# Patient Record
Sex: MALE | Race: WHITE | Employment: UNEMPLOYED | URBAN - METROPOLITAN AREA
[De-identification: names, ages, dates, MRNs, and addresses within clinical notes are randomized per-mention and may not be internally consistent; named-entity substitution may affect disease eponyms.]

---

## 2020-11-30 ENCOUNTER — HOSPITAL ENCOUNTER (EMERGENCY)
Age: 25
Discharge: HOME | End: 2020-11-30

## 2020-11-30 VITALS
OXYGEN SATURATION: 97 % | TEMPERATURE: 99 F | RESPIRATION RATE: 16 BRPM | HEART RATE: 80 BPM | HEIGHT: 64 IN | DIASTOLIC BLOOD PRESSURE: 71 MMHG | SYSTOLIC BLOOD PRESSURE: 108 MMHG | BODY MASS INDEX: 20.49 KG/M2 | WEIGHT: 120 LBS

## 2020-11-30 PROCEDURE — 6370000000 HC RX 637 (ALT 250 FOR IP): Performed by: NURSE PRACTITIONER

## 2020-11-30 PROCEDURE — 99284 EMERGENCY DEPT VISIT MOD MDM: CPT

## 2020-11-30 RX ORDER — LIDOCAINE HYDROCHLORIDE 20 MG/ML
15 SOLUTION OROPHARYNGEAL ONCE
Status: COMPLETED | OUTPATIENT
Start: 2020-11-30 | End: 2020-11-30

## 2020-11-30 RX ORDER — PENICILLIN V POTASSIUM 500 MG/1
500 TABLET ORAL 4 TIMES DAILY
Qty: 40 TABLET | Refills: 0 | Status: SHIPPED | OUTPATIENT
Start: 2020-11-30 | End: 2020-12-10

## 2020-11-30 RX ORDER — IBUPROFEN 800 MG/1
800 TABLET ORAL 2 TIMES DAILY PRN
Qty: 60 TABLET | Refills: 0 | Status: SHIPPED | OUTPATIENT
Start: 2020-11-30

## 2020-11-30 RX ORDER — HYDROCODONE BITARTRATE AND ACETAMINOPHEN 5; 325 MG/1; MG/1
1 TABLET ORAL EVERY 6 HOURS PRN
Qty: 12 TABLET | Refills: 0 | Status: SHIPPED | OUTPATIENT
Start: 2020-11-30 | End: 2020-12-03

## 2020-11-30 RX ORDER — LIDOCAINE HYDROCHLORIDE 20 MG/ML
15 SOLUTION OROPHARYNGEAL PRN
Qty: 100 ML | Refills: 0 | Status: SHIPPED | OUTPATIENT
Start: 2020-11-30

## 2020-11-30 RX ORDER — IBUPROFEN 800 MG/1
800 TABLET ORAL ONCE
Status: COMPLETED | OUTPATIENT
Start: 2020-11-30 | End: 2020-11-30

## 2020-11-30 RX ORDER — CHLORHEXIDINE GLUCONATE 0.12 MG/ML
15 RINSE ORAL 2 TIMES DAILY
Qty: 420 ML | Refills: 0 | Status: SHIPPED | OUTPATIENT
Start: 2020-11-30 | End: 2020-12-14

## 2020-11-30 RX ORDER — PENICILLIN V POTASSIUM 250 MG/1
500 TABLET ORAL ONCE
Status: COMPLETED | OUTPATIENT
Start: 2020-11-30 | End: 2020-11-30

## 2020-11-30 RX ADMIN — BENZOCAINE: 200 SPRAY DENTAL; ORAL; PERIODONTAL at 18:21

## 2020-11-30 RX ADMIN — LIDOCAINE HYDROCHLORIDE 15 ML: 20 SOLUTION ORAL; TOPICAL at 18:21

## 2020-11-30 RX ADMIN — IBUPROFEN 800 MG: 800 TABLET, FILM COATED ORAL at 18:21

## 2020-11-30 RX ADMIN — PENICILLIN V POTASIUM 500 MG: 250 TABLET ORAL at 18:21

## 2020-11-30 ASSESSMENT — PAIN SCALES - GENERAL
PAINLEVEL_OUTOF10: 8

## 2020-11-30 ASSESSMENT — PAIN SCALES - WONG BAKER: WONGBAKER_NUMERICALRESPONSE: 8

## 2020-11-30 ASSESSMENT — PAIN DESCRIPTION - ORIENTATION: ORIENTATION: RIGHT;UPPER

## 2020-11-30 ASSESSMENT — PAIN DESCRIPTION - PAIN TYPE: TYPE: ACUTE PAIN

## 2020-11-30 ASSESSMENT — PAIN DESCRIPTION - LOCATION: LOCATION: TEETH

## 2020-11-30 NOTE — ED PROVIDER NOTES
[facility] Emergency Department    CHIEF COMPLAINT  Dental Pain (right sided tooth abscess)      HISTORY OF PRESENT ILLNESS  Wilebr Duran is a 26 y.o. male who presents to the ED complaining of upper dental pain for the past 4 days. Patient reports pain and swelling has worsened today. Patient denies fever, chills, body aches, difficulty swallowing, chest pain or shortness of breath. Patient reports he has a history of dental extractions and infections. No other complaints, modifying factors or associated symptoms. Nursing notes reviewed. Past Medical History:   Diagnosis Date    Collar bone fracture 2010    Depression      History reviewed. No pertinent surgical history. History reviewed. No pertinent family history.   Social History     Socioeconomic History    Marital status: Single     Spouse name: Not on file    Number of children: Not on file    Years of education: Not on file    Highest education level: Not on file   Occupational History    Not on file   Social Needs    Financial resource strain: Not on file    Food insecurity     Worry: Not on file     Inability: Not on file    Transportation needs     Medical: Not on file     Non-medical: Not on file   Tobacco Use    Smoking status: Passive Smoke Exposure - Never Smoker   Substance and Sexual Activity    Alcohol use: Yes     Comment: RARELY    Drug use: Not on file    Sexual activity: Not on file   Lifestyle    Physical activity     Days per week: Not on file     Minutes per session: Not on file    Stress: Not on file   Relationships    Social connections     Talks on phone: Not on file     Gets together: Not on file     Attends [employer] service: Not on file     Active member of club or organization: Not on file     Attends meetings of clubs or organizations: Not on file     Relationship status: Not on file    Intimate partner violence     Fear of current or ex partner: Not on file Emotionally abused: Not on file     Physically abused: Not on file     Forced sexual activity: Not on file   Other Topics Concern    Not on file   Social History Narrative    ** Merged History Encounter **          Current Facility-Administered Medications   Medication Dose Route Frequency Provider Last Rate Last Dose    penicillin v potassium (VEETID) tablet 500 mg  500 mg Oral Once Kady A Burriss, APRN - CNP        ibuprofen (ADVIL;MOTRIN) tablet 800 mg  800 mg Oral Once Kady A Burriss, APRN - CNP        lidocaine viscous hcl (XYLOCAINE) 2 % solution 15 mL  15 mL Mouth/Throat Once Kady A Burriss, APRN - CNP        benzocaine (HURRICAINE) 20 % oral spray   Mouth/Throat Once Kady A Burriss, APRN - CNP         Current Outpatient Medications   Medication Sig Dispense Refill    penicillin v potassium (VEETID) 500 MG tablet Take 1 tablet by mouth 4 times daily for 10 days 40 tablet 0    HYDROcodone-acetaminophen (NORCO) 5-325 MG per tablet Take 1 tablet by mouth every 6 hours as needed for Pain for up to 3 days. Intended supply: 3 days. Take lowest dose possible to manage pain 12 tablet 0    ibuprofen (ADVIL;MOTRIN) 800 MG tablet Take 1 tablet by mouth 2 times daily as needed for Pain 60 tablet 0    lidocaine viscous hcl (XYLOCAINE) 2 % SOLN solution Take 15 mLs by mouth as needed for Irritation or Dental Pain 100 mL 0    chlorhexidine (PERIDEX) 0.12 % solution Take 15 mLs by mouth 2 times daily for 14 days 420 mL 0    naproxen (NAPROSYN) 500 MG tablet Take 1 tablet by mouth 2 times daily (with meals) for 10 days 20 tablet 0     No Known Allergies    REVIEW OF SYSTEMS  6 systems reviewed, pertinent positives per HPI otherwise noted to be negative    PHYSICAL EXAM  /71   Pulse 80   Temp 99 °F (37.2 °C) (Temporal)   Resp 16   Ht 5' 4\" (1.626 m)   Wt 120 lb (54.4 kg)   SpO2 97%   BMI 20.60 kg/m²   GENERAL APPEARANCE: Awake and alert. Cooperative. No acute distress. Vital signs stable. Afebrile. HEAD: Normocephalic. Atraumatic. EYES: PERRL. EOM's grossly intact. ENT: Mucous membranes are moist.  There is right cheek edema no erythema. There is no fluctuant abscess along the right upper gumline. Dental decay and veneers in place. Patient maintaining secretions. No tongue elevation or protrusion. Posterior pharynx is clear no erythema, edema, exudate. Uvula midline. NECK: Supple. Normal ROM. No lymphadenopathy. CHEST: Equal symmetric chest rise. LUNGS: Breathing is unlabored. Speaking comfortably in full sentences. No stridor, wheezing, rhonchi, rales. Abdomen: Nondistended  EXTREMITIES: MAEE. No acute deformities. SKIN: Warm and dry. NEUROLOGICAL: Alert and oriented. Strength is 5/5 in all extremities and sensation is intact. RADIOLOGY  No results found. ED COURSE/MDM  Patient seen and evaluated. Old records reviewed. Diagnostic testing reviewed and results discussed. I have independently evaluated this patient based upon my scope of practice. Supervising physician was in the department as needed for consultation. Anisa Francisco presented to the ED today with above noted complaints. Patient treated for possible dental infection. No fluctuant abscess to incise and drain. Patient provided with a list of dental clinics for follow-up. Patient agreeable with plan. Patient received viscous lidocaine, benzocaine, and ibuprofen for pain, with good relief. While in ED patient received   Medications   penicillin v potassium (VEETID) tablet 500 mg (has no administration in time range)   ibuprofen (ADVIL;MOTRIN) tablet 800 mg (has no administration in time range)   lidocaine viscous hcl (XYLOCAINE) 2 % solution 15 mL (has no administration in time range)   benzocaine (HURRICAINE) 20 % oral spray (has no administration in time range)       At this point I do not feel the patient requires further work up and it is reasonable to discharge the patient.      A appropriate use of these medication. New Prescriptions    CHLORHEXIDINE (PERIDEX) 0.12 % SOLUTION    Take 15 mLs by mouth 2 times daily for 14 days    HYDROCODONE-ACETAMINOPHEN (NORCO) 5-325 MG PER TABLET    Take 1 tablet by mouth every 6 hours as needed for Pain for up to 3 days. Intended supply: 3 days. Take lowest dose possible to manage pain    IBUPROFEN (ADVIL;MOTRIN) 800 MG TABLET    Take 1 tablet by mouth 2 times daily as needed for Pain    LIDOCAINE VISCOUS HCL (XYLOCAINE) 2 % SOLN SOLUTION    Take 15 mLs by mouth as needed for Irritation or Dental Pain    PENICILLIN V POTASSIUM (VEETID) 500 MG TABLET    Take 1 tablet by mouth 4 times daily for 10 days       [facility]  [address]          DISPOSITION  Patient was discharged to home in good condition. Comment: Please note this report has been produced using speech recognition software and may contain errors related to that system including errors in grammar, punctuation, and spelling, as well as words and phrases that may be inappropriate. If there are any questions or concerns please feel free to contact the dictating provider for clarification.         [id number] Sherri Still, APRN - CNP  11/30/20 8186

## 2023-01-20 ENCOUNTER — HOSPITAL ENCOUNTER (EMERGENCY)
Age: 28
Discharge: HOME | End: 2023-01-20
Attending: EMERGENCY MEDICINE

## 2023-01-20 VITALS
TEMPERATURE: 97.8 F | RESPIRATION RATE: 18 BRPM | HEIGHT: 64 IN | SYSTOLIC BLOOD PRESSURE: 131 MMHG | OXYGEN SATURATION: 99 % | BODY MASS INDEX: 20.49 KG/M2 | HEART RATE: 77 BPM | DIASTOLIC BLOOD PRESSURE: 76 MMHG | WEIGHT: 120 LBS

## 2023-01-20 DIAGNOSIS — K03.81 CRACKED TOOTH: Primary | ICD-10-CM

## 2023-01-20 DIAGNOSIS — K04.7 DENTAL INFECTION: ICD-10-CM

## 2023-01-20 DIAGNOSIS — K08.89 PAIN, DENTAL: ICD-10-CM

## 2023-01-20 PROCEDURE — 6370000000 HC RX 637 (ALT 250 FOR IP): Performed by: REGISTERED NURSE

## 2023-01-20 PROCEDURE — 99283 EMERGENCY DEPT VISIT LOW MDM: CPT

## 2023-01-20 RX ORDER — CHLORHEXIDINE GLUCONATE 0.12 MG/ML
15 RINSE ORAL 3 TIMES DAILY
Qty: 1 EACH | Refills: 0 | Status: SHIPPED | OUTPATIENT
Start: 2023-01-20 | End: 2023-02-03

## 2023-01-20 RX ORDER — LIDOCAINE HYDROCHLORIDE 20 MG/ML
15 SOLUTION OROPHARYNGEAL ONCE
Status: COMPLETED | OUTPATIENT
Start: 2023-01-20 | End: 2023-01-20

## 2023-01-20 RX ORDER — NAPROXEN 500 MG/1
500 TABLET ORAL ONCE
Status: COMPLETED | OUTPATIENT
Start: 2023-01-20 | End: 2023-01-20

## 2023-01-20 RX ORDER — LIDOCAINE HYDROCHLORIDE 20 MG/ML
15 SOLUTION OROPHARYNGEAL EVERY 4 HOURS PRN
Qty: 1 EACH | Refills: 0 | Status: SHIPPED | OUTPATIENT
Start: 2023-01-20 | End: 2023-01-25

## 2023-01-20 RX ORDER — NAPROXEN 500 MG/1
500 TABLET ORAL 2 TIMES DAILY PRN
Qty: 20 TABLET | Refills: 0 | Status: SHIPPED | OUTPATIENT
Start: 2023-01-20 | End: 2023-01-30

## 2023-01-20 RX ORDER — CLINDAMYCIN HYDROCHLORIDE 150 MG/1
450 CAPSULE ORAL ONCE
Status: COMPLETED | OUTPATIENT
Start: 2023-01-20 | End: 2023-01-20

## 2023-01-20 RX ORDER — CLINDAMYCIN HYDROCHLORIDE 150 MG/1
450 CAPSULE ORAL 3 TIMES DAILY
Qty: 63 CAPSULE | Refills: 0 | Status: SHIPPED | OUTPATIENT
Start: 2023-01-20 | End: 2023-01-27

## 2023-01-20 RX ADMIN — LIDOCAINE HYDROCHLORIDE 15 ML: 20 SOLUTION ORAL at 23:46

## 2023-01-20 RX ADMIN — NAPROXEN 500 MG: 500 TABLET ORAL at 23:45

## 2023-01-20 RX ADMIN — CLINDAMYCIN HYDROCHLORIDE 450 MG: 150 CAPSULE ORAL at 23:45

## 2023-01-20 ASSESSMENT — ENCOUNTER SYMPTOMS
CONSTIPATION: 0
VOMITING: 0
RHINORRHEA: 0
TROUBLE SWALLOWING: 0
NAUSEA: 0
SORE THROAT: 0
DIARRHEA: 0
ABDOMINAL PAIN: 0
EYE PAIN: 0
SHORTNESS OF BREATH: 0
COUGH: 0
BACK PAIN: 0
FACIAL SWELLING: 1

## 2023-01-20 ASSESSMENT — PAIN DESCRIPTION - LOCATION: LOCATION: MOUTH

## 2023-01-20 ASSESSMENT — PAIN DESCRIPTION - PAIN TYPE: TYPE: ACUTE PAIN

## 2023-01-20 ASSESSMENT — PAIN - FUNCTIONAL ASSESSMENT: PAIN_FUNCTIONAL_ASSESSMENT: 0-10

## 2023-01-20 ASSESSMENT — PAIN DESCRIPTION - FREQUENCY: FREQUENCY: CONTINUOUS

## 2023-01-20 ASSESSMENT — PAIN SCALES - GENERAL: PAINLEVEL_OUTOF10: 9

## 2023-01-20 ASSESSMENT — PAIN DESCRIPTION - ORIENTATION: ORIENTATION: LEFT

## 2023-01-21 NOTE — ED PROVIDER NOTES
[address] ED  EMERGENCY DEPARTMENT ENCOUNTER        Pt Name: Leonel Triana  MRN: [id number]  [place] 1995  Date of evaluation: 1/20/2023  Provider: UMA Hernandez CNP  PCP: No primary care provider on file. This patient was seen and evaluated by the attending physician Tatyana Crowley MD.    I have evaluated this patient. My supervising physician was available for consultation. CHIEF COMPLAINT       Chief Complaint   Patient presents with    Abscess     Pt complaining of let side facial abscess x 3 days. Denies fever or drainage. HISTORY OF PRESENT ILLNESS   (Location/Symptom, Timing/Onset, Context/Setting, Quality, Duration, Modifying Factors, Severity)  Note limiting factors. Leonel Triana is a 33 y.o. male who presents via private car for dental pain, left-sided facial swelling. Onset was 3 days ago. Duration has been since the onset. Context includes patient presents to the emergency department today endorsing that he developed left upper sided dental pain approximately 3 to 4 days ago. He states shortly after that he noticed some mild swelling to the left cheek overlying the pain and states that his swelling has increased today. He denies history of IV drug use but states he does smoke marijuana. He denies any chest pain, palpitations or swelling in his extremities. He denies any shortness of breath, cough congestion or fevers. He denies abdominal pain, nausea vomiting, diarrhea or constipation. He denies any trismus, sublingual swelling, mastoid tenderness, neck pain or swelling, difficulty swallowing or tolerating oral secretions. Quality is dull and aching with radiation to his left face. Alleviating factors include nothing. Aggravating factors include eating, drinking exacerbates the pain. Pain is 9/10. Nothing has been used for pain today. Chart review reveals pt has significant PMHx of no significant past medical history.  They take no medications. Nursing Notes were all reviewed and agreed with or any disagreements were addressed  in the HPI. Pt was seen during the [place] 19 pandemic. Appropriate PPE worn by ME during patient encounters. Pt seen during a time with constrained hospital bed capacity and other potential inpatient and outpatient resources were constrained due to the viral pandemic. REVIEW OF SYSTEMS    (2-9 systems for level 4, 10 or more for level 5)     Review of Systems   Constitutional:  Negative for chills, diaphoresis and fever. HENT:  Positive for dental problem and facial swelling. Negative for congestion, rhinorrhea, sore throat and trouble swallowing. Eyes:  Negative for pain and visual disturbance. Respiratory:  Negative for cough and shortness of breath. Cardiovascular:  Negative for chest pain and leg swelling. Gastrointestinal:  Negative for abdominal pain, constipation, diarrhea, nausea and vomiting. Musculoskeletal:  Negative for back pain, neck pain and neck stiffness. Skin:  Negative for rash and wound. Neurological:  Negative for dizziness, light-headedness and headaches. Positives and Pertinent negatives as per HPI. Except as noted abovein the ROS, all other systems were reviewed and negative. PAST MEDICAL HISTORY     Past Medical History:   Diagnosis Date    Collar bone fracture 2010    Depression          SURGICAL HISTORY   History reviewed. No pertinent surgical history. CURRENTMEDICATIONS       Previous Medications    No medications on file         ALLERGIES     Patient has no known allergies. FAMILYHISTORY     History reviewed. No pertinent family history.        SOCIAL HISTORY       Social History     Socioeconomic History    Marital status: Single     Spouse name: None    Number of children: None    Years of education: None    Highest education level: None   Tobacco Use    Smoking status: Never     Passive exposure: Yes   Substance and Sexual Activity Alcohol use: Yes     Comment: RARELY    Drug use: Yes     Types: Marijuana Linotamiko Kandy)   Social History Narrative    ** Merged History Encounter **            SCREENINGS    Hammond Coma Scale  Eye Opening: Spontaneous  Best Verbal Response: Oriented  Best Motor Response: Obeys commands  Hammond Coma Scale Score: 15        PHYSICAL EXAM    (up to 7 for level 4, 8 or more for level 5)     ED Triage Vitals   BP Temp Temp Source Heart Rate Resp SpO2 Height Weight   01/20/23 2310 01/20/23 2326 01/20/23 2326 01/20/23 2310 01/20/23 2310 01/20/23 2310 01/20/23 2310 01/20/23 2310   131/76 97.8 °F (36.6 °C) Oral 77 18 99 % 5' 4\" (1.626 m) 120 lb (54.4 kg)       Physical Exam  Vitals and nursing note reviewed. Constitutional:       Appearance: Normal appearance. He is not ill-appearing or diaphoretic. HENT:      Head: Normocephalic and atraumatic. No raccoon eyes or Sultana's sign. Jaw: There is normal jaw occlusion. No trismus, tenderness, swelling, pain on movement or malocclusion. Right Ear: External ear normal. No mastoid tenderness. Left Ear: External ear normal. No mastoid tenderness. Mouth/Throat:      Lips: Pink. Mouth: Mucous membranes are moist. No injury, lacerations, oral lesions or angioedema. Dentition: Abnormal dentition. Does not have dentures. Dental tenderness and dental caries present. No gingival swelling, dental abscesses or gum lesions. Tongue: No lesions. Tongue does not deviate from midline. Palate: No mass and lesions. Pharynx: Oropharynx is clear. Uvula midline. No pharyngeal swelling, oropharyngeal exudate, posterior oropharyngeal erythema or uvula swelling. Tonsils: No tonsillar exudate or tonsillar abscesses. 1+ on the right. 1+ on the left. Comments: Significant dental caries with multiple chipped and broken teeth. Most notably tooth #16 1514 and 13 are broken to the gumline with some surrounding erythema.   No concerns for Olive's angina  Eyes:      General:         Right eye: No discharge. Left eye: No discharge. Cardiovascular:      Rate and Rhythm: Normal rate and regular rhythm. Pulses: Normal pulses. Radial pulses are 2+ on the right side and 2+ on the left side. Heart sounds: Normal heart sounds. No murmur heard. No friction rub. No gallop. Pulmonary:      Effort: Pulmonary effort is normal. No respiratory distress. Breath sounds: Normal breath sounds. No stridor. No wheezing, rhonchi or rales. Musculoskeletal:         General: Normal range of motion. Cervical back: Normal range of motion and neck supple. Skin:     General: Skin is warm and dry. Capillary Refill: Capillary refill takes less than 2 seconds. Neurological:      General: No focal deficit present. Mental Status: He is alert and oriented to person, place, and time. GCS: GCS eye subscore is 4. GCS verbal subscore is 5. GCS motor subscore is 6. Psychiatric:         Mood and Affect: Mood normal.         Behavior: Behavior normal.     PHYSICAL EXAM  /76   Pulse 77   Temp 97.8 °F (36.6 °C) (Oral)   Resp 18   Ht 5' 4\" (1.626 m)   Wt 120 lb (54.4 kg)   SpO2 99%   BMI 20.60 kg/m²       DIAGNOSTIC RESULTS   LABS:    Labs Reviewed - No data to display    All other labs were within normal range or not returned as of this dictation. EKG: All EKG's are interpreted by the Emergency Department Physician who either signs orCo-signs this chart in the absence of a cardiologist.  Please see their note for interpretation of EKG. RADIOLOGY:   Non-plain film images such as CT, Ultrasound and MRI are read by the radiologist. Plain radiographic images are visualized andpreliminarily interpreted by the  ED Provider with the below findings:        Interpretation [facility] Radiologist below, if available at the time of this note:    No orders to display     No results found.        PROCEDURES   Unless otherwise noted below, none     Procedures    CRITICAL CARE TIME   N/A    CONSULTS:  None      EMERGENCY DEPARTMENT COURSE and DIFFERENTIALDIAGNOSIS/MDM:   Vitals:    Vitals:    01/20/23 2310 01/20/23 2326   BP: 131/76    Pulse: 77    Resp: 18    Temp:  97.8 °F (36.6 °C)   TempSrc:  Oral   SpO2: 99%    Weight: 120 lb (54.4 kg)    Height: 5' 4\" (1.626 m)        Patient was given thefollowing medications:  Medications   clindamycin (CLEOCIN) capsule 450 mg (has no administration in time range)   lidocaine viscous hcl (XYLOCAINE) 2 % solution 15 mL (has no administration in time range)   naproxen (NAPROSYN) tablet 500 mg (has no administration in time range)       PDMP Monitoring:    Last PDMP Niko as Reviewed [facility]):  Review User Review Instant Review Result            Urine Drug Screenings (1 yr)    No resulted procedures found. Medication Contract and Consent for Opioid Use Documents Filed        No documents found                    MDM:   This patient was seen and evaluated by myself and my attending physician. He presents to the emergency department today with complaints of left-sided dental pain. With facial swelling. On exam he is alert and oriented, hemodynamically stable and nontoxic in appearance. On evaluation he has multiple dental caries and chipped and broken teeth. Most notably tooth #16, 15, 14, 13 are broken to the gumline with some surrounding erythema and tenderness to palpation. There is no area of fluctuance concerning for developing abscess. He has no concerning signs of Olive's angina including being negative for mastoid tenderness, sublingual swelling, difficulty swallowing or tolerating oral secretions, trismus, neck pain or stiffness. I discussed with him a plan for discharge including p.o. antibiotics and medications for pain as well as following up with a dentist.  He was agreeable to this plan.   He was provided with a first dose of clindamycin in the emergency department as well as naproxen p.o. for pain and viscous lidocaine to swish and spit for pain control. I provided him with prescriptions for clindamycin to be taken 3 times daily for the next 7 days as well as a prescription for Peridex mouthwash, viscous lidocaine to be used as needed for pain and naproxen. He was provided with a list of dental clinics and instructed to call to schedule an appointment. He was provided with very strict return precautions for the emergency department including but not limited to worsening pain, increasing swelling, signs of Olive's angina, these were reviewed. Or other concerns. Patient did verbalize understanding of all discharge teaching was ultimately discharged in a stable condition with all questions answered. Is this patient to be included in the SEP-1 Core Measure due to severe sepsis or septic shock? No   Exclusion criteria - the patient is NOT to be included for SEP-1 Core Measure due to:  2+ SIRS criteria are not met    Discharge Time out:  CC Reviewed Yes   Test Results Yes     Vitals:    01/20/23 2326   BP:    Pulse:    Resp:    Temp: 97.8 °F (36.6 °C)   SpO2:               FINAL IMPRESSION      1. Cracked tooth    2. Pain, dental    3. Dental infection          DISPOSITION/PLAN   DISPOSITION Decision To Discharge 01/20/2023 11:38:45 PM      PATIENT REFERREDTO:  No follow-up provider specified. DISCHARGE MEDICATIONS:  New Prescriptions    CHLORHEXIDINE (PERIDEX) 0.12 % SOLUTION    Swish and spit 15 mLs 3 times daily for 14 days    CLINDAMYCIN (CLEOCIN) 150 MG CAPSULE    Take 3 capsules by mouth 3 times daily for 7 days    LIDOCAINE VISCOUS HCL (XYLOCAINE) 2 % SOLN SOLUTION    Take 15 mLs by mouth every 4 hours as needed for Dental Pain or Pain Take 15 mLs by mouth every 4 hours as needed for Irritation or Pain.   You can swish and swallow or gargle    NAPROXEN (NAPROSYN) 500 MG TABLET    Take 1 tablet by mouth 2 times daily as needed for Pain       DISCONTINUED MEDICATIONS:  Discontinued Medications    IBUPROFEN (ADVIL;MOTRIN) 800 MG TABLET    Take 1 tablet by mouth 2 times daily as needed for Pain    LIDOCAINE VISCOUS HCL (XYLOCAINE) 2 % SOLN SOLUTION    Take 15 mLs by mouth as needed for Irritation or Dental Pain    NAPROXEN (NAPROSYN) 500 MG TABLET    Take 1 tablet by mouth 2 times daily (with meals) for 10 days              (Please note that portions ofthis note were completed with a voice recognition program.  Efforts were made to edit the dictations but occasionally words are mis-transcribed.)    UMA Manning CNP (electronically signed)       UMA Manning CNP  01/20/23 9861

## 2023-01-21 NOTE — ED NOTES
Pt discharged back home, reviewed discharged instructions with pt follow up care , pain control and return precautions discussed , pt verbalized understanding.  Pt ambulated out of the department with steady gait          Александр Vides RN  01/20/23 3895

## 2023-01-21 NOTE — DISCHARGE INSTRUCTIONS
Dental Emergency Referrals    [address] [facility] only)    [facility] AT [place]  [address] [phone]   [facility].  [phone]   [address] [place] [address]  [address]  [phone]   [facility] [place]  (entrance on [address] [facility]. [address] [place] [address].)  [address]  [phone]   [facility] [address].  [phone]   [facility]  [address] [place] [address].  [phone] [address] [facility]  [address] [facility].  [phone]   [facility]  [address]  [phone]     [facility] MEDICAL [place]  [address] [facility] 2nd floor  [phone]   [facility]  [address] [phone] ([address] [facility])  [place]: [address]: [address]  [phone]   [address] [facility] [address]  [facility]  [phone]     [facility]  [address]  [phone]   [address]  [address] [facility].  Oral Surgery Dept: [phone]  Dental Clinic: [address] [facility]  [phone]     [address] [phone]   [facility]   [address] [place], [address]  [place] : [address] : [phone]     [facility]  [address]    Other [address] in the area    [address] [facility] (Dental Urgent Care)  [facility]  (Across from [address] [facility])  [facility], [address] [facility]  [phone]?       Pediatric Only Dentists    [address]   Up to age 21  [address]  Up to age 24  [place]: [place]   [phone] or [address] [facility] [place]: [address] [place] [address]  Up to age 14  [address]  [phone]

## 2023-01-21 NOTE — ED PROVIDER NOTES
[address] [facility] ED  [address] [facility] [place]. [address]  Dept: [phone]  Loc: [phone]    Open Note Time:  12:40 AM EST    I independently performed a history and physical on [address] . Chief Complaint  Abscess (Pt complaining of let side facial abscess x 3 days. Denies fever or drainage. )       This is a very pleasant 33 y.o. male  who was evaluated in the emergency department for facial swelling and tooth pain    Denies dysphagia, dyspnea, neck stiffness, and odynophagia. Unless otherwise stated in this report or unable to obtain because of the patient's clinical or mental status as evidenced by the medical record, this patient's positive and negative responses for review of systems, constitutional, psych, eyes, ENT, cardiovascular, respiratory, gastrointestinal, neurological, genitourinary, musculoskeletal, integument systems and systems related to the presenting problem are either stated in the preceding paragraph or were not pertinent or were negative for the symptoms and/or complaints related to the medical problem. I wore goggles, surgical mask, N95 mask and gloves when I evaluated the patient. Focused exam:  Body mass index is 20.6 kg/m².   The physical exam reveals an alert and oriented patient who does not appear to be confused, non-ill-appearing, no abnormal heart sounds, no abnormal lung sounds, speaking comfortably in full sentences, moderate disc caries of the left upper teeth of multiple teeth, some mild tenderness to palpation, some mild swelling of the left cheek however there is no palpable fluctuant area    Brief ED course/MDM:     Procedures/interventions/images ordered for this visit  Orders Placed This Encounter   Procedures    Vital signs       Medications ordered for this visit  Orders Placed This Encounter   Medications    clindamycin (CLEOCIN) 150 MG capsule     Sig: Take 3 capsules by mouth 3 times daily for 7 days Dispense:  63 capsule     Refill:  0     May substitute 150 mg capsules as needed for cost or insurance purposes    lidocaine viscous hcl (XYLOCAINE) 2 % SOLN solution     Sig: Take 15 mLs by mouth every 4 hours as needed for Dental Pain or Pain Take 15 mLs by mouth every 4 hours as needed for Irritation or Pain. You can swish and swallow or gargle     Dispense:  1 each     Refill:  0    naproxen (NAPROSYN) 500 MG tablet     Sig: Take 1 tablet by mouth 2 times daily as needed for Pain     Dispense:  20 tablet     Refill:  0    chlorhexidine (PERIDEX) 0.12 % solution     Sig: Swish and spit 15 mLs 3 times daily for 14 days     Dispense:  1 each     Refill:  0    clindamycin (CLEOCIN) capsule 450 mg     Order Specific Question:   Antimicrobial Indications     Answer:   Head and Neck Infection    lidocaine viscous hcl (XYLOCAINE) 2 % solution 15 mL    naproxen (NAPROSYN) tablet 500 mg       ED course notes for this visit       Differential Diagnosis: Dental Abscess, Airway Obstruction, Vikas's Angina, Bacteremia/Sepsis. Patient seen and evaluated in room 04/04    Denies dysphagia, dyspnea, neck stiffness, and odynophagia. Due to the location of the dental pain patient was not offered an inferior alveolar block    Patient seen and examined. In addition to the noted physical exam, I did not observe brawny edema, uvular deviation, menigismus, stridor, trismus, or drooling. Patient is tolerating saliva. Submandibular and sublingual areas are soft. Nontoxic appearance and no significant distress. Discussed treatment options with the patient and that the definitive care for this patient's symptoms is to see a dentist or an endodontist. No sign of Vikass Angina or deep space neck infection, and there is no airway compromise. Will start on antibiotics for dental infection and possible early abscess. There is nothing that I can see or palpate on exam that requires drainage at this time.  Patient to obtain dental wax to cover the tooth and protect it from temperature changes and exposure to air when possible. Pain management and follow-up plan were discussed with the patient. It is understood that if the patient is not improving as expected or if other new symptoms or signs of concern develop, specifically pain, swelling of the face or neck or a fever, other etiologies or diagnoses may need to be considered requiring other tests, treatments, consultations, and/or admission. The diagnosis, plan, expected course, follow-up, and return precautions were discussed and all questions were answered. Final Impression    1. Cracked tooth    2. Pain, dental    3. Dental infection        Blood pressure 131/76, pulse 77, temperature 97.8 °F (36.6 °C), temperature source Oral, resp. rate 18, height 5' 4\" (1.626 m), weight 120 lb (54.4 kg), SpO2 99 %. Medication Summary  Patient was given scripts for the following medications. I counseled patient how to take these medications. Discharge Medication List as of 1/20/2023 11:48 PM        START taking these medications    Details   clindamycin (CLEOCIN) 150 MG capsule Take 3 capsules by mouth 3 times daily for 7 days, Disp-63 capsule, R-0May substitute 150 mg capsules as needed for cost or insurance purposesPrint      chlorhexidine (PERIDEX) 0.12 % solution Swish and spit 15 mLs 3 times daily for 14 days, Disp-1 each, R-0Print             Patient had scripts modified or refilled for the following medications. I counseled patient how to take these medications. Discharge Medication List as of 1/20/2023 11:48 PM        CONTINUE these medications which have CHANGED    Details   lidocaine viscous hcl (XYLOCAINE) 2 % SOLN solution Take 15 mLs by mouth every 4 hours as needed for Dental Pain or Pain Take 15 mLs by mouth every 4 hours as needed for Irritation or Pain.   You can swish and swallow or gargle, Disp-1 each, R-0Print      naproxen (NAPROSYN) 500 MG tablet Take 1 tablet by mouth 2 times daily as needed for Pain, Disp-20 tablet, R-0Print             Patient had scripts discontinues for the following medications. I counseled patient to stop taking these medications. Discharge Medication List as of 1/20/2023 11:48 PM        STOP taking these medications       ibuprofen (ADVIL;MOTRIN) 800 MG tablet Comments:   Reason for Stopping:               Disposition  At this point I do not feel the patient requires further work up and it is reasonable to discharge the patient. I had a discussion with the patient and/or their surrogate regarding diagnosis, diagnostic testing results, treatment/ plan of care, and follow up. Patient and/or companions verbalized understanding of the ED workup, any relevant findings as well as any incidental findings, and the disposition and plan. There was shared decision-making between myself as well as the patient and/or their surrogate and we are all in agreement with discharge home. There was an opportunity for questions and all questions were answered to the best of my ability and to the satisfaction of the patient and/or patient's family. Patient agreed to follow up as recommend for further evaluation/treatment. The patient was given strict return precautions as we discussed symptoms that would necessitate return to the ED. Patient will return to ED for new/worsening symptoms. The patient verbalized their understanding and agreement with the above plan. Please refer to AVS for further details regarding discharge instructions. Pt is in stable condition upon Discharge to home. All diagnostic, treatment, and disposition decisions were made by myself in conjunction with the EVA/resident. For further details of the patient's emergency department visit, please see EVA/resident documentation. Initial history and physical exam information was obtained by the EVA/resident, also dictated a record of this visit.   I independently examined and evaluated this patient and participated in the diagnostic, treatment and disposition decisions. I personally saw the patient and performed a substantive portion of the visit including all aspects of the medical decision making. Pt was seen during the [place] 19 pandemic. Appropriate PPE worn by this writer during patient encounters. Pt seen during a time with constrained hospital bed capacity and other potential inpatient and outpatient resources were constrained due to the viral pandemic. The note was completed using Dragon voice recognition transcription. Every effort was made to ensure accuracy; however, inadvertent transcription errors may be present despite my best efforts to edit errors.     Bernard Maxwell MD  [address] [facility]        Bernard Maxwell MD  01/21/23 [id number]

## 2023-03-17 PROCEDURE — 64400 NJX AA&/STRD TRIGEMINAL NRV: CPT

## 2023-03-17 PROCEDURE — 64450 NJX AA&/STRD OTHER PN/BRANCH: CPT

## 2023-03-17 PROCEDURE — 99283 EMERGENCY DEPT VISIT LOW MDM: CPT

## 2023-03-18 ENCOUNTER — HOSPITAL ENCOUNTER (EMERGENCY)
Age: 28
Discharge: HOME | End: 2023-03-18
Attending: EMERGENCY MEDICINE
Payer: MEDICAID

## 2023-03-18 VITALS
DIASTOLIC BLOOD PRESSURE: 78 MMHG | SYSTOLIC BLOOD PRESSURE: 102 MMHG | OXYGEN SATURATION: 96 % | RESPIRATION RATE: 16 BRPM | HEART RATE: 89 BPM | WEIGHT: 110 LBS | BODY MASS INDEX: 18.88 KG/M2 | TEMPERATURE: 98.1 F

## 2023-03-18 DIAGNOSIS — K08.89 PAIN, DENTAL: Primary | ICD-10-CM

## 2023-03-18 LAB
ALBUMIN SERPL-MCNC: 4.7 G/DL (ref 3.4–5)
ALBUMIN/GLOB SERPL: 2.5 {RATIO} (ref 1.1–2.2)
ALP SERPL-CCNC: 46 U/L (ref 40–129)
ALT SERPL-CCNC: 9 U/L (ref 10–40)
ANION GAP SERPL CALCULATED.3IONS-SCNC: 16 MMOL/L (ref 3–16)
APAP SERPL-MCNC: <5 UG/ML (ref 10–30)
AST SERPL-CCNC: 14 U/L (ref 15–37)
BILIRUB SERPL-MCNC: 0.8 MG/DL (ref 0–1)
BUN SERPL-MCNC: 13 MG/DL (ref 7–20)
CALCIUM SERPL-MCNC: 9.5 MG/DL (ref 8.3–10.6)
CHLORIDE SERPL-SCNC: 100 MMOL/L (ref 99–110)
CO2 SERPL-SCNC: 20 MMOL/L (ref 21–32)
CREAT SERPL-MCNC: 0.7 MG/DL (ref 0.9–1.3)
GFR SERPLBLD CREATININE-BSD FMLA CKD-EPI: >60 ML/MIN/{1.73_M2}
GLUCOSE SERPL-MCNC: 76 MG/DL (ref 70–99)
POTASSIUM SERPL-SCNC: 4.7 MMOL/L (ref 3.5–5.1)
PROT SERPL-MCNC: 6.6 G/DL (ref 6.4–8.2)
REASON FOR REJECTION: NORMAL
REJECTED TEST: NORMAL
SODIUM SERPL-SCNC: 136 MMOL/L (ref 136–145)

## 2023-03-18 PROCEDURE — 80053 COMPREHEN METABOLIC PANEL: CPT

## 2023-03-18 PROCEDURE — 80143 DRUG ASSAY ACETAMINOPHEN: CPT

## 2023-03-18 PROCEDURE — 36415 COLL VENOUS BLD VENIPUNCTURE: CPT

## 2023-03-18 RX ORDER — CLINDAMYCIN HYDROCHLORIDE 300 MG/1
300 CAPSULE ORAL 3 TIMES DAILY
Qty: 21 CAPSULE | Refills: 0 | Status: SHIPPED | OUTPATIENT
Start: 2023-03-18 | End: 2023-03-25

## 2023-03-18 RX ORDER — NAPROXEN 500 MG/1
500 TABLET ORAL 2 TIMES DAILY WITH MEALS
Qty: 60 TABLET | Refills: 0 | Status: SHIPPED | OUTPATIENT
Start: 2023-03-18

## 2023-03-18 RX ORDER — LIDOCAINE HYDROCHLORIDE 20 MG/ML
15 SOLUTION OROPHARYNGEAL PRN
Qty: 100 ML | Refills: 0 | Status: SHIPPED | OUTPATIENT
Start: 2023-03-18

## 2023-03-18 ASSESSMENT — PAIN SCALES - GENERAL: PAINLEVEL_OUTOF10: 7

## 2023-03-18 ASSESSMENT — PAIN DESCRIPTION - DESCRIPTORS: DESCRIPTORS: ACHING

## 2023-03-18 ASSESSMENT — PAIN DESCRIPTION - LOCATION: LOCATION: MOUTH

## 2023-03-18 ASSESSMENT — PAIN - FUNCTIONAL ASSESSMENT: PAIN_FUNCTIONAL_ASSESSMENT: 0-10

## 2023-03-18 ASSESSMENT — PAIN DESCRIPTION - PAIN TYPE: TYPE: ACUTE PAIN

## 2023-03-18 NOTE — ED PROVIDER NOTES
[address] ED  EMERGENCY DEPARTMENT ENCOUNTER        Patient Name: Jimmy Glover  MRN: [id number]  [place] 1995  Date of evaluation: 3/17/2023  Provider: Abel Rodriguez MD  PCP: No primary care provider on file. Note Started: 2:06 AM EDT 3/18/23    CHIEF COMPLAINT       Dental Pain (Reports he has already be treated for dental abscess 2 months ago, states he has appointment March 21st with [facility]. States he is out of Naprosyn and viscous lidocaine. ) and Emesis (States emesis today, pt is concerned it is related to taking too much Tylenol. )      HISTORY OF PRESENT ILLNESS: 1 or more Elements     History from : Patient    Limitations to history : None    Jimmy Glover is a 33 y.o. male who presents for evaluation of dental pain. Patient states that he has had a bad tooth that has been causing pain and has had intermittent bleeding. He states that he has a dentist appointment this coming week. He denies any difficulty swallowing or breathing. He has been taking Tylenol he is concerned whether he may have taken too much Tylenol, he states he takes about 1000 mg every 5 hours or so. He also is out of naproxen and viscous lidocaine. No neck swelling. Nursing Notes were all reviewed and agreed with or any disagreements were addressed in the HPI. REVIEW OF SYSTEMS :      Review of Systems    Positives and Pertinent negatives as per HPI. SURGICAL HISTORY   History reviewed. No pertinent surgical history. CURRENTMEDICATIONS       Previous Medications    NAPROXEN (NAPROSYN) 500 MG TABLET    Take 1 tablet by mouth 2 times daily as needed for Pain       ALLERGIES     Patient has no known allergies. FAMILYHISTORY     History reviewed. No pertinent family history. SOCIAL HISTORY       Social History     Tobacco Use    Smoking status: Never     Passive exposure: Yes   Substance Use Topics    Alcohol use: Yes     Comment: RARELY    Drug use:  Yes Types: Marijuana (Weed)       SCREENINGS        [place]res Coma Scale  Eye Opening: Spontaneous  Best Verbal Response: Oriented  Best Motor Response: Obeys commands  Samara Coma Scale Score: 15                CIWA Assessment  BP: 99/70  Heart Rate: 99           PHYSICAL EXAM  1 or more Elements     ED Triage Vitals [03/18/23 0021]   BP Temp Temp Source Heart Rate Resp SpO2 Height Weight   99/70 98.1 °F (36.7 °C) Oral 99 16 96 % -- 110 lb (49.9 kg)       General: No acute distress. Alert and Oriented. Appears stated age. HEENT: . Full ROM of the neck. There is no significant cervical lympadenopathy. No difficulty tolerating oral secretions. There is no elevation of the floor the mouth. There is no stridor. There is no induration of the neck. There is poor dentition, there is cracked tooth #30  Cardiac: Regular rate and rhythm. Radial pulses are intact bilaterally. Chest: No respiratory distress. Clear breath sounds bilaterally. No increased work of breathing. No use of accessory muscles for respiration. Abdomen: Soft, nontender, nondistended, non-peritonitic. Extremities:No significant lower extremity edema. Lower extremities are symmetric. Neuro: Moving all extremities. No focal deficits. Speech is clear. Skin:No rash, no erythema  Psych: Calm and cooperative.       DIAGNOSTIC RESULTS   LABS:    Labs Reviewed   COMPREHENSIVE METABOLIC PANEL W/ REFLEX TO MG FOR LOW K - Abnormal; Notable for the following components:       Result Value    CO2 20 (*)     Creatinine 0.7 (*)     Albumin/Globulin Ratio 2.5 (*)     ALT 9 (*)     AST 14 (*)     All other components within normal limits   ACETAMINOPHEN LEVEL - Abnormal; Notable for the following components:    Acetaminophen Level <5 (*)     All other components within normal limits   SPECIMEN REJECTION    Narrative:     CALL  [place]  [facility] tel. [phone], recollect needed/ hemolyzed  aminta, 03/18/2023 01:26, by Baron Null       When ordered only abnormal lab results are displayed. All other labs were within normal range or not returned as of this dictation. RADIOLOGY:   Non-plain film images such as CT, Ultrasound and MRI are read by the radiologist. Plain radiographic images are visualized and preliminarily interpreted by the ED Provider with the below findings:        Interpretation per the Radiologist below, if available at the time of this note:    No orders to display     No results found. Bedside Ultrasound, as interpreted by me, if performed:    No results found. PROCEDURES     Unless otherwise noted below, none     Dental Nerve Block    Date/Time: 3/18/2023 2:09 AM  Performed by: Gregg Nissen, MD  Authorized by: Gregg Nissen, MD     Consent:     Consent obtained:  Verbal    Consent given by:  Patient  Universal protocol:     Procedure explained and questions answered to patient or proxy's satisfaction: yes      Patient identity confirmed:  Verbally with patient  Indications:     Indications: dental pain    Location:     Block type: Inferior alveolar    Laterality:  Right  Procedure details:     Anesthetic injected:  Bupivacaine 0.25% w/o epi  Post-procedure details:     Outcome:  Anesthesia achieved    Procedure completion:  Tolerated    CRITICAL CARE TIME     I personally spent a total of 0 minutes of critical care time in obtaining history, performing a physical exam, bedside monitoring of interventions, collecting and interpreting tests and discussion with consultants but excluding time spent performing procedures, treating other patients and teaching time. PAST MEDICAL HISTORY      has a past medical history of Collar bone fracture (2010) and Depression.      EMERGENCY DEPARTMENT COURSE and DIFFERENTIAL DIAGNOSIS/MDM:     Vitals:    Vitals:    03/18/23 0021   BP: 99/70   Pulse: 99   Resp: 16   Temp: 98.1 °F (36.7 °C)   TempSrc: Oral   SpO2: 96%   Weight: 110 lb (49.9 kg)       Patient was treated with and given the following medications:  Medications - No data to display              CC/HPI Summary, DDx, ED Course, and Reassessment:     81-year-old male presenting for evaluation of dental pain. He has no signs of Olive's angina he is protecting his airway and no significant intraoral abscess or infection appreciated. Dental nerve block was performed under to provide analgesia. We did obtain Tylenol level as well as liver panel because of patient's concern for taking too much Tylenol however liver function is within normal limits, Tylenol is negative so I do not suspect significant Tylenol toxicity at this point in time. Patient will be represcribed clindamycin, viscous lidocaine and Naprosyn for continued management of his symptoms and he was advised to keep his follow-up dentistry appointment for next week. Patient expresses understanding of return plan    CONSULTS: (Who and What was discussed)  None    Discussion with Other Professionals : None      Social Determinants : None    Patient's care impacted by chronic condition(s): n/a    Records Reviewed : None    Clinical information obtained from an independent historian. Disposition Considerations (include 1 Tests not done, Shared Decision Making, Pt Expectation of Test or Tx.):     The patient will be discharged from the emergency department. The patient was counseled on their diagnosis and any medications prescribed. They were advised on the need for PCP followup. They were counseled on the need to return to the emergency department if any of their symptoms were to worsen, change or have any other concerns. Discharged in stable condition. I am the Primary Clinician of Record. FINAL IMPRESSION      1.  Pain, dental          DISPOSITION/PLAN     DISPOSITION Decision To Discharge 03/18/2023 02:04:15 AM      PATIENT REFERRED TO:  [address] ED  [address] [address] [facility] [address] [place] [address]          DISCHARGE MEDICATIONS:  Patient was given scripts for the following medications. I counseled patient how to take these medications:  New Prescriptions    CLINDAMYCIN (CLEOCIN) 300 MG CAPSULE    Take 1 capsule by mouth 3 times daily for 7 days    LIDOCAINE VISCOUS HCL (XYLOCAINE) 2 % SOLN SOLUTION    Take 15 mLs by mouth as needed for Irritation    NAPROXEN (NAPROSYN) 500 MG TABLET    Take 1 tablet by mouth 2 times daily (with meals)       DISCONTINUED MEDICATIONS:  Discontinued Medications    No medications on file              (This chart was generated in part by using Dragon Dictation system and may contain errors related to that system including errors in grammar, punctuation, and spelling, as well as words and phrases that may be inappropriate.  If there are any questions or concerns please feel free to contact the dictating provider for clarification.)    MD Ngozi Nieves MD  03/18/23 5778

## 2023-03-18 NOTE — DISCHARGE INSTRUCTIONS
You have been represcribed naproxen, lidocaine as well as antibiotics for potential dental infection. Take these as directed. Return for worsening symptoms or difficulty breathing.

## 2023-03-18 NOTE — Clinical Note
Nargis Castorena was seen and treated in our emergency department on 3/17/2023. He may return to work on 03/19/2023. If you have any questions or concerns, please don't hesitate to call.       Daylin Urbano MD